# Patient Record
Sex: FEMALE | Race: WHITE | NOT HISPANIC OR LATINO | Employment: FULL TIME | URBAN - METROPOLITAN AREA
[De-identification: names, ages, dates, MRNs, and addresses within clinical notes are randomized per-mention and may not be internally consistent; named-entity substitution may affect disease eponyms.]

---

## 2017-01-05 ENCOUNTER — ALLSCRIPTS OFFICE VISIT (OUTPATIENT)
Dept: OTHER | Facility: OTHER | Age: 37
End: 2017-01-05

## 2017-01-13 ENCOUNTER — ALLSCRIPTS OFFICE VISIT (OUTPATIENT)
Dept: OTHER | Facility: OTHER | Age: 37
End: 2017-01-13

## 2017-01-13 ENCOUNTER — TRANSCRIBE ORDERS (OUTPATIENT)
Dept: ADMINISTRATIVE | Facility: HOSPITAL | Age: 37
End: 2017-01-13

## 2017-01-13 DIAGNOSIS — R20.0 FACIAL NUMBNESS: Primary | ICD-10-CM

## 2017-01-19 ENCOUNTER — HOSPITAL ENCOUNTER (OUTPATIENT)
Dept: CT IMAGING | Facility: CLINIC | Age: 37
Discharge: HOME | End: 2017-01-19
Payer: COMMERCIAL

## 2017-01-19 DIAGNOSIS — R20.0 FACIAL NUMBNESS: ICD-10-CM

## 2017-01-19 PROCEDURE — 70486 CT MAXILLOFACIAL W/O DYE: CPT

## 2017-01-21 ENCOUNTER — GENERIC CONVERSION - ENCOUNTER (OUTPATIENT)
Dept: OTHER | Facility: OTHER | Age: 37
End: 2017-01-21

## 2018-01-10 NOTE — RESULT NOTES
Message   Recorded as Task   Date: 05/16/2016 03:59 PM, Created By: Rober Arndt   Task Name: Call Patient with results   Assigned To: Caty Sotelo   Regarding Patient: Abbey Esquivel, Status: In Progress   CommentMadeleine Prader - 16 May 2016 3:59 PM     Patient Phone: [phone]    herniated disc---refer pain MD Pierre Canela - 17 May 2016 8:59 AM     TASK EDITED  Msg left C# to [facility] for results   Yolanda Chaparro - 17 May 2016 9:03 AM     TASK EDITED  PT has appt scheduled with [facility] on 5/24/16, Dr Sheryl Barriga    Will send task to  for him to review results  :)        Signatures   Electronically signed by : Kaiser Friend, ; May 17 2016  9:03AM EST                       (Author)

## 2018-01-10 NOTE — MISCELLANEOUS
Message   Recorded as Task   Date: 05/16/2016 03:59 PM, Created By: Ashleigh Rachel   Task Name: Call Patient with results   Assigned To: [facility]   Regarding Patient: Eugenia Dickerson, Status: Active   Comment:    Ashleigh Rachel - 16 May 2016 3:59 PM     Patient Phone: [phone]    herniated disc---refer pain Yolanda Sood - 17 May 2016 8:59 AM     TASK EDITED  Msg left C# to Macarthur Lefort for results   Yolanda Chaparro - 17 May 2016 9:03 AM     TASK EDITED  PT has appt scheduled with [facility] on 5/24/16, Dr Jeanette Hermosillo  Will send task to  for him to review results  :)   Yolanda Chaparro - 17 May 2016 9:04 AM     TASK EDITED  SL- for you to review- has CON 5/24/16  Yolanda Chaparro - 17 May 2016 9:04 AM     TASK EDITED   Asa Jason - 28 Dec 2016 2:02 PM     TASK EDITED  Pt arrived - cons 5/24/16        Active Problems    1  Acute bilateral low back pain with right-sided sciatica (724 2,724 3,338 19) (M54 41)   2  Anxiety and depression (300 00,311) (F41 9,F32 9)   3  Herniated nucleus pulposus, L4-5 right (722 10) (M51 26)   4  Herniated nucleus pulposus, L5-S1 (722 10) (M51 27)   5  Hip bursitis (726 5) (M70 70)   6  Intervertebral disc disorder with radiculopathy of lumbosacral region (724 4) (M51 17)   7  Right lumbar radiculopathy (724 4) (M54 16)    Current Meds   1  Aleve PM TABS; Therapy: (Recorded:32Fhq9495) to Recorded   2  Citalopram Hydrobromide 40 MG Oral Tablet; take 1 tablet by mouth every day; Therapy: 47DKN6606 to (Evaluate:14Wqw9607)  Requested for: 21Jun2016; Last   Rx:21Jun2016 Ordered    Allergies    1   No Known Drug Allergies    Signatures   Electronically signed by : Jose Luis Malone, ; Dec 28 2016  2:02PM EST                       (Author)

## 2018-01-11 NOTE — RESULT NOTES
Verified Results  CT SINUS WO CONTRAST 83FSE3983 06:14PM Ridge Ohs     Test Name Result Flag Reference   CT SINUS WO CONTRAST (Report)     CT SINUSES     INDICATION: R20 0: Anesthesia of skin (this clinical history is taken directly from the electronic ordering system)  Numbness in the nasal region radiating to the upper teeth  Axillary sinus pressure  COMPARISON: None  TECHNIQUE: Axial CT imaging through the sinuses was performed  In addition, sagittal and coronal reformatted images were submitted for interpretation  This examination, like all CT scans performed in the [facility], was performed    utilizing techniques to minimize radiation dose exposure, including the use of iterative reconstruction and automated exposure control  IMAGE QUALITY: Diagnostic  FINDINGS:      FRONTAL SINUSES: Clear  ETHMOID AIR CELLS: Clear  MAXILLARY SINUSES: There is minimal mucosal thickening in the floor the maxillary antra bilaterally  No maxillary sinus polyps or retention cysts  No maxillary sinus fluid levels  SPHENOID SINUSES: Clear  Patent sphenoethmoidal recesses  NASAL SEPTUM AND CAVITY: Midline  Normal nasal cavity  ANTERIOR OSTEOMEATAL COMPLEX: Patent  OSSEOUS STRUCTURES: No bony erosion or destruction  Normal cribiform plate and Planum Spendoidale  Visulaized mastoid temporal bones are clear  The bony walls of the carotid canals are intact  There is a cavity at the site of extracted left maxillary    tooth  SOFT TISSUES: Normal        IMPRESSION:     Unremarkable examination  No CT abnormality to account for the patient's symptoms         Workstation performed: [id number]     Signed by:   Rocio Escobar MD   1/20/17

## 2018-01-11 NOTE — PROGRESS NOTES
Assessment    1  Acute bilateral low back pain with right-sided sciatica (724 2,724 3,338 19) (M54 41)    Plan  Acute bilateral low back pain with right-sided sciatica    · Follow-up PRN Evaluation and Treatment  Follow-up  Status: Complete  Done:  15DIF2345 08:18AM   · 1 Angel Fernandez DO  (Pain Management) Physician Referral  Consult  Status: Active   Requested for: 69EDM3195  Care Summary provided  : Yes    Discussion/Summary    Needs MRI  Chief Complaint  C/O (RIGHT) Hip pain continues ([facility])      History of Present Illness  HPI: R sciatica for 3mos--no resp to homexercises--or pred --severe--rad down R leg      Active Problems    1  Anxiety and depression (300 00,311) (F41 9,F32 9)   2  Hip bursitis (726 5) (M70 70)    Past Medical History  Active Problems And Past Medical History Reviewed: The active problems and past medical history were reviewed and updated today  Family History  Mother    1  No pertinent family history  Family History Reviewed: The family history was reviewed and updated today  Social History    · Never a smoker    Current Meds   1  Citalopram Hydrobromide 40 MG Oral Tablet; TAKE 1 TABLET DAILY; Therapy: 67ROD8515 to (Evaluate:04Apr2016) Recorded    Allergies    1  No Known Drug Allergies    Vitals   Recorded: 09AYU1523 08:05AM   Heart Rate 88   Respiration 16   Systolic 678   Diastolic 78   Height 5 ft 8 in   Weight 163 lb    BMI Calculated 24 78   BSA Calculated 1 87   Pain Scale 10     Physical Exam    Constitutional   General appearance: No acute distress, well appearing and well nourished  Pulmonary   Auscultation of lungs: Clear to auscultation  Cardiovascular   Auscultation of heart: Normal rate and rhythm, normal S1 and S2, without murmurs  Abdomen   Abdomen: Non-tender, no masses  Musculoskeletal   Inspection/palpation of joints, bones, and muscles: Abnormal   + SLR @ 30 degr          Future Appointments    Date/Time Provider Specialty Site 05/09/2016 08:15 AM Yeni Goode MD Family Medicine [address] [place] [address]     Signatures   Electronically signed by : Evelyn Weldon MD; May  9 2016  8:19AM EST                       (Author)

## 2018-01-11 NOTE — CONSULTS
Assessment  Assessed    1  Herniated nucleus pulposus, L4-5 right (722 10) (M51 26)   2  Herniated nucleus pulposus, L5-S1 (722 10) (M51 27)   3  Right lumbar radiculopathy (724 4) (M54 16)    Plan  Herniated nucleus pulposus, L4-5 right, Herniated nucleus pulposus, L5-S1, Right  lumbar radiculopathy    · *1 - [facility] Physical Therapy Physical Therapy  Consult  Status: Hold For - Scheduling   Requested for: 36GBF0705   Ordered; For: Herniated nucleus pulposus, L4-5 right, Herniated nucleus pulposus, L5-S1, Right lumbar radiculopathy; Ordered By: Joanna Laird Performed:  Due: 82QAO9663  Care Summary provided  : Yes    Discussion/Summary    I think she should do well with a course of physical therapy, Nelia approach, prescription was provided  LASTs pain persists despite time, relative rest, activity modification and therapy  I believe the she would benefit from right L5/S1 level transforaminal epidural steroid injection to diminish any inflammatory component of her pain  We will initially use a transforaminal approach to better concentrate the steroid along the affected nerve root  The injection may need to be repeated based on the degree of pain relief following the initial injection  In the office today, we reviewed the nature of GRECIA's pathology in depth using diagrams and models  We discussed the approach we would use for the epidural steroid injection and provided literature for home review  The patient understands the risks associated with the procedure including bleeding, infection, tissue injury, allergic reaction and paralysis and provided written and verbal consent in the office today  Chief Complaint  Chief Complaints    1   Pain    History of Present Illness  New consult - low back and right leg pain   No pervious or current injuries  Previous steroid injection from Dr Nanette Francis - did not help  No previous or current PT  jvma    I personally reviewed the patient's past medical history, surgical history, allergies, current medications social history, family history and review of systems  Full intake form reviewed with the patient  Referring physician is  Dr Nanette Francis   Primary Care physician is  Dr Benz Sensor presents with complaints of constant episodes of severe bilateral lower back, right buttock, right hip, right thigh, right knee, right lower leg, right ankle and right foot pain, described as sharp, radiating to the lower back, right buttock and right lower extremity  On a scale of 1 to 10, the patient rates the pain as 8  Episodes started about 11 months ago  Her symptoms are caused by no known event  Symptoms are improved by rest  Symptoms are made worse by walking, exercise and bending  Symptoms are worsening  Review of Systems    Musculoskeletal: difficulty walking, muscle weakness, pain in extremity right leg and decreased range of motion  ROS reviewed  Active Problems  Problems    1  Acute bilateral low back pain with right-sided sciatica (724 2,724 3,338 19) (M54 41)   2  Anxiety and depression (300 00,311) (F41 9,F32 9)   3  Hip bursitis (726 5) (M70 70)    Past Medical History  Problems    1  History of depression (V11 8) (Z86 59)    The active problems and past medical history were reviewed and updated today  Surgical History  Problems    1  History of Oral Surgery Tooth Extraction [place] Tooth    The surgical history was reviewed and updated today  Family History  Family History    1  Family history of lung cancer (V16 1) (Z80 1)   2  Family history of lymphoma (V16 7) (Z80 2)    The family history was reviewed and updated today  Social History  Problems    · Denied: History of Alcohol drinker   · Full-time employment   · Lives with family   · Denied: History of Marijuana   · Never a smoker   · Single   · Denied: History of Tobacco use  The social history was reviewed and updated today  Current Meds   1   Aleve PM TABS; Therapy: (Recorded:90Wes4171) to Recorded   2  Citalopram Hydrobromide 40 MG Oral Tablet; TAKE 1 TABLET DAILY; Therapy: 14TZS1473 to (Evaluate:04Apr2016) Recorded    The medication list was reviewed and updated today  Allergies  Medication    1  No Known Drug Allergies    Vitals  Vital Signs [Data Includes: Current Encounter]    Recorded: 36BKK4338 08:59AM   Temperature 98 1 F   Heart Rate 81   Respiration 18   Systolic 883   Diastolic 64   Height 5 ft 8 in   Weight 163 lb    BMI Calculated 24 78   BSA Calculated 1 88   Pain Scale 9     Physical Exam    Constitutional   General appearance: Well developed, well nourished, alert, in no distress, non-toxic and no overt pain behavior  Eyes   Sclera: anicteric   HEENT   Hearing grossly intact  Neck   Neck: Supple, symmetric, trachea midline, no masses  Pulmonary   Respiratory effort: Even and unlabored  Cardiovascular   Examination of extremities: No edema or pitting edema present  Skin   Skin and subcutaneous tissue: Normal without rashes or lesions, well hydrated  Psychiatric   Mood and affect: Abnormal   Mood and Affect: in pain  Neurologic   Cranial nerves: Cranial nerves II-XII grossly intact  Musculoskeletal   Gait and station: Normal     Lumbar/Sacral Spine examination demonstrates  Inspection: Normal station and gait  Normal lumbar lordotic curve with no significant scoliosis or spinal step-off  Skin intact without erythema  No gross mass or muscle atrophy  Palpation: There is no tenderness to palpation overlying the sacroiliac joint as well as the ischial bursa bilateral  No significant tenderness over the greater trochanteric bursa bilaterally  Motor/Strength: 5/5 strength in the bilateral lower limbs  The patient is able to heel and/toe walk  Tandem gait is intact     Reflexes: Deep tendon reflex are 2+ and symmetrical bilateral patella and Achilles  Sensation: Sensation intact to light touch and pinprick in the bilateral lower limbs  Proprioception is intact at bilateral hallux  Maneuvers: Negative bilateral straight leg raisinig  Negative Raúl's maneuver  Inspection: Normal station and gait  Normal lumbar lordotic curve with no significant scoliosis or spinal step-off  Skin intact without erythema  No gross mass or muscle atrophy  Palpation: There is no tenderness to palpation overlying the sacroiliac joint as well as the ischial bursa bilateral  No significant tenderness over the greater trochanteric bursa bilaterally  Motor/Strength: 5/5 strength in the bilateral lower limbs  The patient is able to heel and/toe walk  Tandem gait is intact  Reflexes: Deep tendon reflex are 2+ and symmetrical bilateral patella and Achilles  Sensation: Sensation intact to light touch and pinprick in the bilateral lower limbs  Proprioception is intact at bilateral hallux  Maneuvers: Positive straight leg raising on the right  Negative Raúl's maneuver  Results/Data    Results    I personally reviewed the films/images in the office today  Results   * MRI LUMBAR SPINE WO CONTRAST [id number] 08:00AM [facility] Order Number: [id number]     Test Name Result Flag Reference   MRI LUMBAR SPINE [address] (Report)     This is a summary report  The complete report is available in the patient's medical record  If you cannot access the medical record, please contact the sending organization for a detailed fax or copy  MRI LUMBAR SPINE WITHOUT CONTRAST     INDICATION: Low back pain into the right hip with some occasional foot and calf numbness  COMPARISON: Plain films September 9, 2015     TECHNIQUE: Sagittal T1, sagittal T2, sagittal inversion recovery, axial T1 and axial T2, coronal T2       IMAGE QUALITY: Diagnostic     FINDINGS:     ALIGNMENT: Slight straightening of the normal lumbar lordosis  MARROW SIGNAL: Endplate degenerative marrow signal L4-5       DISTAL CORD AND CONUS: Normal size and signal within the distal cord and conus  The conus ends at the L1 level  PARASPINAL SOFT TISSUES: Paraspinal soft tissues are unremarkable  SACRUM: Normal signal within the sacrum  No evidence of insufficiency or stress fracture  LOWER THORACIC DISC SPACES: Normal disc height and signal  No disc herniation, canal stenosis or foraminal narrowing  LUMBAR DISC SPACES:        L1-L2: Normal      L2-L3: Normal      L3-L4: Normal      L4-L5: Diffuse annular bulging identified somewhat eccentric to the right with marginal osteophytes  Mild central and moderate right lateral recess stenosis is identified  Disc material contacts the right L5 nerve root in the lateral recess  L5-S1: Broad-based central protrusion type disc herniation identified which contacts the bilateral S1 nerve root in the lateral recess  IMPRESSION:     Annular bulging eccentric to the right at L4-5 contacts the right L5 nerve root in the lateral recess  Correlate for right L5 radiculopathy  This broad-based central protrusion type disc herniation L5-S1 contacts the bilateral S1 nerve roots in the lateral recess  Workstation performed: [id number]     Signed by:   Marlin Alcala DO   5/16/16     * Hip Xray 2 View 27VCZ7532 02:00PM Dinora Blank     Test Name Result Flag Reference   XR Hip 2 View (Report)     [address];;[place];[address]   09/09/2015 1405   09/09/2015 1428   3 VIEWS     RIGHT HIP     INDICATION- Right hip pain/numbness x3 months  COMPARISON- None     VIEWS- AP pelvis and 2 coned down views of the hip& 3 images^ 3 images     FINDINGS-     There is no acute fracture or dislocation  No degenerative changes  No lytic or blastic lesions are seen  Soft tissues are unremarkable  IMPRESSION-     Normal examination         Transcribed on- [id number]     NIA Ji MD   Reading Radiologist- NIA Meeks MD   Electronically Signed- NIA Jenkins MD   Released Date Time- 09/09/15 1453   ------------------------------------------------------------------------------   [id number]^ANEUDY ADAM   [id number]^ANEUDY ADAM     * XR Lumbosacral Spine Complete 4 Views (non-injury) 13ODY3024 02:00PM Elmo Rehman     Test Name Result Flag Reference   XR LSpine>4 View (Report)     [address] [facility] [address] [place] [address];;[place];[address]   09/09/2015 1405   09/09/2015 1428   4 VIEWS     LUMBAR SPINE     INDICATION- Right leg pain/numbness  COMPARISON- None     VIEWS- AP, lateral and bilateral oblique projections& 4 images^ 4 images     FINDINGS-     Alignment is unremarkable  There is no radiographic evidence of acute fracture or destructive   osseous lesion  Moderate disc degenerative changes at L4-5  Visualized soft tissues appear unremarkable  IMPRESSION-     Moderate disc degenerative changes at L4-5                 Transcribed on- [id number]     NIA Horton MD   Reading Radiologist- NIA Jenkins MD   Electronically Signed- NIA Jenkins MD   Released Date Time- 09/09/15 1454   ------------------------------------------------------------------------------   [id number]^ANEUDY ADAM   [id number]^ANEUDY ADAM     Signatures   Electronically signed by : Moe Christiansen DO; May 24 2016  9:10AM EST                       (Author)

## 2018-01-12 VITALS
DIASTOLIC BLOOD PRESSURE: 78 MMHG | HEART RATE: 102 BPM | OXYGEN SATURATION: 98 % | BODY MASS INDEX: 21.22 KG/M2 | WEIGHT: 140 LBS | SYSTOLIC BLOOD PRESSURE: 116 MMHG | HEIGHT: 68 IN

## 2018-01-12 NOTE — RESULT NOTES
Verified Results  * MRI LUMBAR SPINE WO CONTRAST [id number] 08:00AM Jesusita Cordoba Order Number: [id number]     Test Name Result Flag Reference   MRI LUMBAR SPINE [address] (Report)     This is a summary report  The complete report is available in the patient's medical record  If you cannot access the medical record, please contact the sending organization for a detailed fax or copy  MRI LUMBAR SPINE WITHOUT CONTRAST     INDICATION: Low back pain into the right hip with some occasional foot and calf numbness  COMPARISON: Plain films September 9, 2015     TECHNIQUE: Sagittal T1, sagittal T2, sagittal inversion recovery, axial T1 and axial T2, coronal T2       IMAGE QUALITY: Diagnostic     FINDINGS:     ALIGNMENT: Slight straightening of the normal lumbar lordosis  MARROW SIGNAL: Endplate degenerative marrow signal L4-5  DISTAL CORD AND CONUS: Normal size and signal within the distal cord and conus  The conus ends at the L1 level  PARASPINAL SOFT TISSUES: Paraspinal soft tissues are unremarkable  SACRUM: Normal signal within the sacrum  No evidence of insufficiency or stress fracture  LOWER THORACIC DISC SPACES: Normal disc height and signal  No disc herniation, canal stenosis or foraminal narrowing  LUMBAR DISC SPACES:        L1-L2: Normal      L2-L3: Normal      L3-L4: Normal      L4-L5: Diffuse annular bulging identified somewhat eccentric to the right with marginal osteophytes  Mild central and moderate right lateral recess stenosis is identified  Disc material contacts the right L5 nerve root in the lateral recess  L5-S1: Broad-based central protrusion type disc herniation identified which contacts the bilateral S1 nerve root in the lateral recess  IMPRESSION:     Annular bulging eccentric to the right at L4-5 contacts the right L5 nerve root in the lateral recess  Correlate for right L5 radiculopathy       This broad-based central protrusion type disc herniation L5-S1 contacts the bilateral S1 nerve roots in the lateral recess         Workstation performed: [id number]     Signed by:   Anil Whitney DO   5/16/16

## 2018-01-13 NOTE — MISCELLANEOUS
Message   Recorded as Task   Date: 05/12/2016 04:16 PM, Created By: Irvin Tamayo   Task Name: Miscellaneous   Assigned To: SPINE AND PAIN,Team   Regarding Patient: Adrien Frederick, Status: Active   CommentJosphine Goodness - 12 May 2016 4:16 PM     TASK CREATED  Please obtain a referral for this specialty appointment  Pt is scheduled 5/24/2016 with Dr Rivers, pain management,  pt's dx acute fidel low back pain with right sided sciatica, pt's insurance is [facility], pt's pcp is Dr Reyes  Thank you  Sherri Carter - 12 May 2016 4:36 PM     TASK REASSIGNED: Previously Assigned To specialty referral,TEAM  APPT [id number] WITH DR Juan Melton, WWB#[id number], VALID [phone], PT NOTIFIED (REFERRAL CREATED BY MO2)    MB        Active Problems    1  Acute bilateral low back pain with right-sided sciatica (724 2,724 3,338 19) (M54 41)   2  Anxiety and depression (300 00,311) (F41 9,F32 9)   3  Hip bursitis (726 5) (M70 70)    Current Meds   1  Citalopram Hydrobromide 40 MG Oral Tablet; TAKE 1 TABLET DAILY; Therapy: 44TLX7660 to (Evaluate:04Apr2016) Recorded    Allergies    1   No Known Drug Allergies    Signatures   Electronically signed by : Sage Carrizales, ; May 18 2016 10:14AM EST                       (Author)

## 2018-01-13 NOTE — MISCELLANEOUS
Message   Recorded as Task   Date: 2016 10:01 AM, Created By: Raúl Franklin   Task Name: Intake   Assigned To: SPINE AND PAIN,Team   Regarding Patient: Arely Darling, Status: Active   CommentDiogo Soto - 12 May 2016 10:01 AM     TASK CREATED  Date: 16    Patient Name: Nesha Kothari  : [birth year]  Address: [address], [place], [facility]  ]  Home Phone: [phone]  Cell Phone: [  ]    Insurance: [id number]  ]  Referral Required? Pt will check if referral is needed  PCP: Oniel Paul  Phone: [phone]      No  Date of Injury: [  ]  Employer: [  ]  Co: [  ]  Claim #: [  ]  Address: [  ]  Contact Name: [  ]  Phone #: [  ]    Reviewed: [  ]    Ref Phys: Chelsea Lowery  Phone #: [phone]    Reason for Appointment:   Studies:  No /TBD MRI back at [facility] on 16  Where: [  ]  When: [  ]    Have you seen a pain specialist in the past?  No  (needed records) Who, Where and When? [   ]    Notes: [  ]  Appointment: Date [  ] Time: [  ]    Dr Bam Randolph        Mailed Packet:  No  Printed Forms: date 16  MG * Pt infomed to arrive early for appt  Reviewed by: [  ]   Shaina Beard - 12 May 2016 11:34 AM     TASK REPLIED TO: Previously Assigned To SPINE AND PAIN,Team  All [place] ins require ins referrals  Raúl Franklin - 12 May 2016 4:17 PM     TASK EDITED  Pt referral has been requested through the specialty referral team  Pt is scheduled 2016 @9:00a  Active Problems    1  Acute bilateral low back pain with right-sided sciatica (724 2,724 3,338 19) (M54 41)   2  Anxiety and depression (300 00,311) (F41 9,F32 9)   3  Hip bursitis (726 5) (M70 70)    Current Meds   1  Citalopram Hydrobromide 40 MG Oral Tablet; TAKE 1 TABLET DAILY; Therapy: 49IUO5574 to (Evaluate:2016) Recorded    Allergies    1  No Known Drug Allergies    Signatures   Electronically signed by :  Kae Flower, ; May 13 2016 12:17PM EST (Author)

## 2018-01-14 VITALS
BODY MASS INDEX: 24.71 KG/M2 | DIASTOLIC BLOOD PRESSURE: 76 MMHG | SYSTOLIC BLOOD PRESSURE: 120 MMHG | HEIGHT: 68 IN | WEIGHT: 163 LBS

## 2018-01-18 NOTE — MISCELLANEOUS
Message   Recorded as Task   Date: 05/09/2016 08:19 AM, Created By: System   Task Name: Schedule Appointment   Assigned To: [facility],Team   Regarding Patient: Adrien Frederick, Status: In Progress   Comment:    System - 09 May 2016 8:19 AM     Preferred Communication: Home Phone  Ordering Site: [facility]    Referred To: Jaren Briones  (Pain Management)  To Be Done: 09 May 2016   Juany Omer - 09 May 2016 3:40 PM     TASK REASSIGNED: Previously Assigned To Po Reyes  Please call patient to schedule an appointment within 48 hours on their  The best time to reach the patient is in the  Patient prefers appointment to be  or     Last Mammogram date:[  ]    After this appointment is scheduled please reply back to [facility] team    Brionna De La Fuente - 10 May 2016 8:00 AM     TASK IN PROGRESS   JuanitoApril - 10 May 2016 1:44 PM     TASK EDITED  5/10 LVM @ [phone]   [facility] - 11 May 2016 12:50 PM     TASK EDITED  5/11 LVM @ [phone]   Concepción Hurt - 12 May 2016 10:06 AM     TASK EDITED  Please obtain a referral for this specialty appointment  Pt is sched 5/24/2016 with Dr Rivers, dx acute fidel LBP w right sided sciatica, pt's insurance is [facility], pt's pcp is Dr Reyes  Thank you  María Elena rAnett - 12 May 2016 10:11 AM     TASK IN PROGRESS   María Elena Arnett - 12 May 2016 10:53 AM     TASK REASSIGNED: Previously Assigned To specialty referral,TEAM  5/12/16 APPT WITH DR Yamileth Hollingsworth 5/24/16 INS REFERRAL IS COMPLETED & PATIENT HAS BEEN NOTIFIED/MO2        Active Problems    1  Acute bilateral low back pain with right-sided sciatica (724 2,724 3,338 19) (M54 41)   2  Anxiety and depression (300 00,311) (F41 9,F32 9)   3  Hip bursitis (726 5) (M70 70)    Current Meds   1  Citalopram Hydrobromide 40 MG Oral Tablet; TAKE 1 TABLET DAILY; Therapy: [id number] to (Evaluate:[id number]) Recorded    Allergies    1   No Known Drug Allergies    Signatures Electronically signed by :  Kaykay Cuevas, ; May 12 2016  2:10PM EST                       (Author)

## 2018-05-08 ENCOUNTER — OFFICE VISIT (OUTPATIENT)
Dept: FAMILY MEDICINE CLINIC | Facility: CLINIC | Age: 38
End: 2018-05-08
Payer: COMMERCIAL

## 2018-05-08 VITALS
HEIGHT: 68 IN | BODY MASS INDEX: 21.82 KG/M2 | SYSTOLIC BLOOD PRESSURE: 90 MMHG | OXYGEN SATURATION: 98 % | DIASTOLIC BLOOD PRESSURE: 60 MMHG | RESPIRATION RATE: 16 BRPM | HEART RATE: 116 BPM | WEIGHT: 144 LBS

## 2018-05-08 DIAGNOSIS — F41.9 ANXIETY: Primary | ICD-10-CM

## 2018-05-08 DIAGNOSIS — Z00.00 WELLNESS EXAMINATION: ICD-10-CM

## 2018-05-08 PROCEDURE — 1036F TOBACCO NON-USER: CPT | Performed by: FAMILY MEDICINE

## 2018-05-08 PROCEDURE — 3008F BODY MASS INDEX DOCD: CPT | Performed by: FAMILY MEDICINE

## 2018-05-08 PROCEDURE — 99213 OFFICE O/P EST LOW 20 MIN: CPT | Performed by: FAMILY MEDICINE

## 2018-05-08 PROCEDURE — 99395 PREV VISIT EST AGE 18-39: CPT | Performed by: FAMILY MEDICINE

## 2018-05-08 RX ORDER — CITALOPRAM 40 MG/1
40 TABLET ORAL DAILY
Qty: 90 TABLET | Refills: 3 | Status: SHIPPED | OUTPATIENT
Start: 2018-05-08 | End: 2019-05-19 | Stop reason: SDUPTHER

## 2018-05-08 NOTE — PROGRESS NOTES
HPI:  Padmini Siddiqi is a 40 y o  female here for her yearly health maintenance exam    There is no problem list on file for this patient  No past medical history on file  1  Advanced Directive: n     2  Durable Power of  for Healthcare:n     3  Social History:           Drug and alcohol History: n                  4  General Health: good              Immunizations up to date: y                 Lifestyle:                           Healthy Diet:y                          Alcohol Use:n                          Tobacco Use:n                          Regular exercise:y                          Weight concerns:n                               5  Over the past 2 weeks, how often have you been bothered by the following:              Little interest or pleasure in doing things:n              Felling down, depressed or hopeless:n       Current Outpatient Prescriptions   Medication Sig Dispense Refill    citalopram (CeleXA) 40 mg tablet Take 1 tablet (40 mg total) by mouth daily 90 tablet 3     No current facility-administered medications for this visit  No Known Allergies  Immunization History   Administered Date(s) Administered    Influenza Quadrivalent Preservative Free 3 years and older IM 10/10/2016       Patient Care Team:  Sylvie Edgar MD as PCP - General  Samantha Quinn, HEBER Grewal      Physical Exam :  Physical Exam     Reviewed Updated [facility] Prior Wellness Visits:   Last Health Maintenance visit information was reviewed, patient interviewed , no change since last HM visit yes  Last HM visit information was reviewed, patient interviewed and updates made to the record today yes    Assessment and Plan:  1  Anxiety  citalopram (CeleXA) 40 mg tablet   2  Wellness examination         There are no preventive care reminders to display for this patient

## 2018-05-08 NOTE — PROGRESS NOTES
[id number]         NAME: Garry Esposito is a 40 y o  female  : 1980    MRN: [id number]  DATE: May 8, 2018  TIME: 2:50 PM    Assessment and Plan   Anxiety [F41 9]  1  Anxiety  citalopram (CeleXA) 40 mg tablet   2  Wellness examination           Patient Instructions     Patient Instructions   celexa 36          Chief Complaint     Chief Complaint   Patient presents with    Follow-up     wants to go back on celexa    Physical Exam     HM         History of Present Illness       Wants to restart Celexa        Review of Systems   Review of Systems   Constitutional: Negative  Negative for chills, fatigue, fever and unexpected weight change  HENT: Negative  Negative for congestion, ear pain, rhinorrhea, sinus pain and sore throat  Eyes: Negative  Negative for pain, discharge and redness  Respiratory: Negative  Negative for cough, chest tightness, shortness of breath and wheezing  Cardiovascular: Negative  Negative for chest pain, palpitations and leg swelling  Gastrointestinal: Negative for abdominal pain, nausea and vomiting  Endocrine: Negative  Genitourinary: Negative  Negative for dysuria and hematuria  Musculoskeletal: Negative  Negative for arthralgias and myalgias  Allergic/Immunologic: Negative  Neurological: Negative  Negative for dizziness, syncope, speech difficulty, numbness and headaches  Hematological: Negative  Psychiatric/Behavioral: Negative  Negative for agitation, confusion, hallucinations and suicidal ideas           Current Medications       Current Outpatient Prescriptions:     citalopram (CeleXA) 40 mg tablet, Take 1 tablet (40 mg total) by mouth daily, Disp: 90 tablet, Rfl: 3    Current Allergies     Allergies as of 2018    (No Known Allergies)            The following portions of the patient's history were reviewed and updated as appropriate: allergies, current medications, past family history, past medical history, past social history, past surgical history and problem list      No past medical history on file  No past surgical history on file  Family History   Problem Relation Age of Onset    Diabetes Brother     Lung cancer Maternal Grandmother     Lymphoma Maternal Grandfather     Lung cancer Paternal Grandmother     Stomach cancer Paternal Grandfather     Substance Abuse Neg Hx     Mental illness Neg Hx          Medications have been verified  Objective   BP 90/60 (BP Location: Right arm, Patient Position: Sitting, Cuff Size: Standard)   Pulse (!) 116   Resp 16   Ht 5' 8" (1 727 m)   Wt 65 3 kg (144 lb)   SpO2 98%   BMI 21 90 kg/m²        Physical Exam     Physical Exam   Constitutional: She appears well-developed and well-nourished  HENT:   Right Ear: Tympanic membrane, external ear and ear canal normal  Tympanic membrane is not injected  Left Ear: Tympanic membrane, external ear and ear canal normal  Tympanic membrane is not injected  Nose: Nose normal    Mouth/Throat: Oropharynx is clear and moist and mucous membranes are normal    Eyes: Conjunctivae are normal  Pupils are equal, round, and reactive to light  Neck: Normal range of motion  Neck supple  No thyromegaly present  Cardiovascular: Normal rate, regular rhythm, normal heart sounds and intact distal pulses  Pulmonary/Chest: Effort normal and breath sounds normal  She has no wheezes  Nursing note and vitals reviewed

## 2019-05-19 DIAGNOSIS — F41.9 ANXIETY: ICD-10-CM

## 2019-05-20 RX ORDER — CITALOPRAM 40 MG/1
TABLET ORAL
Qty: 90 TABLET | Refills: 3 | Status: SHIPPED | OUTPATIENT
Start: 2019-05-20 | End: 2020-08-03 | Stop reason: ALTCHOICE

## 2020-08-03 ENCOUNTER — OFFICE VISIT (OUTPATIENT)
Dept: FAMILY MEDICINE CLINIC | Facility: CLINIC | Age: 40
End: 2020-08-03
Payer: COMMERCIAL

## 2020-08-03 VITALS
TEMPERATURE: 97.9 F | OXYGEN SATURATION: 98 % | BODY MASS INDEX: 20.64 KG/M2 | DIASTOLIC BLOOD PRESSURE: 68 MMHG | WEIGHT: 136.2 LBS | HEART RATE: 88 BPM | HEIGHT: 68 IN | SYSTOLIC BLOOD PRESSURE: 114 MMHG

## 2020-08-03 DIAGNOSIS — R42 VERTIGO: Primary | ICD-10-CM

## 2020-08-03 DIAGNOSIS — F41.9 ANXIETY: ICD-10-CM

## 2020-08-03 DIAGNOSIS — Z00.00 WELLNESS EXAMINATION: ICD-10-CM

## 2020-08-03 PROCEDURE — 3008F BODY MASS INDEX DOCD: CPT | Performed by: FAMILY MEDICINE

## 2020-08-03 PROCEDURE — 1036F TOBACCO NON-USER: CPT | Performed by: FAMILY MEDICINE

## 2020-08-03 PROCEDURE — 99395 PREV VISIT EST AGE 18-39: CPT | Performed by: FAMILY MEDICINE

## 2020-08-03 PROCEDURE — 3725F SCREEN DEPRESSION PERFORMED: CPT | Performed by: FAMILY MEDICINE

## 2020-08-03 PROCEDURE — 99214 OFFICE O/P EST MOD 30 MIN: CPT | Performed by: FAMILY MEDICINE

## 2020-08-03 RX ORDER — LORAZEPAM 1 MG/1
1 TABLET ORAL EVERY 8 HOURS PRN
Qty: 30 TABLET | Refills: 3 | Status: SHIPPED | OUTPATIENT
Start: 2020-08-03

## 2020-08-03 RX ORDER — MECLIZINE HYDROCHLORIDE 25 MG/1
25 TABLET ORAL 3 TIMES DAILY PRN
Qty: 30 TABLET | Refills: 2 | Status: SHIPPED | OUTPATIENT
Start: 2020-08-03

## 2020-08-03 RX ORDER — CITALOPRAM 40 MG/1
40 TABLET ORAL DAILY
Qty: 90 TABLET | Refills: 3 | Status: SHIPPED | OUTPATIENT
Start: 2020-08-03 | End: 2021-07-28

## 2020-08-03 RX ORDER — PREDNISONE 20 MG/1
TABLET ORAL
Qty: 15 TABLET | Refills: 0 | Status: SHIPPED | OUTPATIENT
Start: 2020-08-03

## 2020-08-03 NOTE — PROGRESS NOTES
[facility]        NAME: Yossi Valdez is a 44 y o  female  : 1980    MRN: [id number]  DATE: August 3, 2020  TIME: 3:34 PM    Assessment and Plan   Vertigo [R42]  1  Vertigo     2  Anxiety           Patient Instructions     Patient Instructions   PT for vertigo if cont--restart celexa          Chief Complaint     Chief Complaint   Patient presents with    Dizziness     x1week    Panic Attack     x1week    Headache     x1week         History of Present Illness       1 wk vertigo--incr anxiety      Review of Systems   Review of Systems   Constitutional: Negative for fatigue, fever and unexpected weight change  HENT: Negative for congestion, sinus pain and sore throat  Eyes: Negative for visual disturbance  Respiratory: Negative for shortness of breath and wheezing  Cardiovascular: Negative for chest pain and palpitations  Gastrointestinal: Negative for abdominal pain, nausea and vomiting  Musculoskeletal: Negative  Negative for arthralgias and myalgias  Neurological: Negative for syncope, weakness and numbness  Psychiatric/Behavioral: Negative  Negative for confusion, dysphoric mood and suicidal ideas  Current Medications     No current outpatient medications on file  Current Allergies     Allergies as of 2020    (No Known Allergies)            The following portions of the patient's history were reviewed and updated as appropriate: allergies, current medications, past family history, past medical history, past social history, past surgical history and problem list      History reviewed  No pertinent past medical history  History reviewed  No pertinent surgical history      Family History   Problem Relation Age of Onset    Diabetes Brother     Lung cancer Maternal Grandmother     Lymphoma Maternal Grandfather     Lung cancer Paternal Grandmother     Stomach cancer Paternal Grandfather     Substance Abuse Neg Hx     Mental illness Neg Hx          Medications have been verified  Objective   /68   Pulse 88   Temp 97 9 °F (36 6 °C) (Temporal)   Ht 5' 8"   Wt 61 8 kg (136 lb 3 2 oz)   LMP 08/01/2020   SpO2 98%   BMI 20 71 kg/m²        Physical Exam     Physical Exam   Constitutional: She is oriented to person, place, and time  She appears well-developed  HENT:   Right Ear: Ear canal normal  Tympanic membrane is not injected  Left Ear: Ear canal normal  Tympanic membrane is not injected  Nose: Nose normal    Eyes: Pupils are equal, round, and reactive to light  Conjunctivae are normal  Right eye exhibits no discharge  Left eye exhibits no discharge  Neck: Normal range of motion  Neck supple  No thyromegaly present  Cardiovascular: Normal rate, regular rhythm and normal heart sounds  No murmur heard  Pulmonary/Chest: Effort normal and breath sounds normal  No respiratory distress  She has no wheezes  Abdominal: Soft  Bowel sounds are normal  She exhibits no distension  There is no abdominal tenderness  Musculoskeletal: Normal range of motion  Lymphadenopathy:     She has no cervical adenopathy  Neurological: She is alert and oriented to person, place, and time  She has normal reflexes  She is not disoriented  No sensory deficit  Gait normal    Skin: Skin is warm and dry     Psychiatric: Her speech is normal and behavior is normal  Judgment and thought content normal

## 2020-08-03 NOTE — PROGRESS NOTES
HPI:  Macey Noble is a 44 y o  female here for her yearly health maintenance exam    There is no problem list on file for this patient  History reviewed  No pertinent past medical history  1  Advanced Directive: n     2  Durable Power of  for Healthcare: n     3  Social History:           Drug and alcohol History: n                  4  Immunizations up to date: y                 Lifestyle:                           Healthy Diet:y                          Alcohol Use:n                          Tobacco Use:n                          Regular exercise:y                          Weight concerns:n                               5  Over the past 2 weeks, how often have you been bothered by the following:              Little interest or pleasure in doing things:n              Felling down, depressed or hopeless:n       Current Outpatient Medications   Medication Sig Dispense Refill    citalopram (CeleXA) 40 mg tablet Take 1 tablet (40 mg total) by mouth daily 90 tablet 3    LORazepam (ATIVAN) 1 mg tablet Take 1 tablet (1 mg total) by mouth every 8 (eight) hours as needed for anxiety 30 tablet 3    meclizine (ANTIVERT) 25 mg tablet Take 1 tablet (25 mg total) by mouth 3 (three) times a day as needed for dizziness 30 tablet 2    predniSONE 20 mg tablet TAKE 1 TABLET BID X 5 DAYS THEN TAKE 1 TABLET QD FOR 5 DAYS 15 tablet 0     No current facility-administered medications for this visit  No Known Allergies  Immunization History   Administered Date(s) Administered    Influenza Quadrivalent Preservative Free 3 years and older IM 10/10/2016       Patient Care Team:  Randell Headings as PCP - General (Family Medicine)  [employer], DO  [place] HEBER Prado    Review of Systems   Constitutional: Negative for appetite change, chills, diaphoresis and fever  HENT: Negative for ear pain, rhinorrhea, sinus pressure and sore throat  Eyes: Negative for discharge, redness and itching     Respiratory: Negative for cough, shortness of breath and wheezing  Cardiovascular: Negative for chest pain and palpitations  Gastrointestinal: Negative for abdominal pain, diarrhea, nausea and vomiting  Physical Exam :  Physical Exam   Constitutional: She appears well-developed  No distress  HENT:   Right Ear: Tympanic membrane, external ear and ear canal normal  Tympanic membrane is not injected  Left Ear: Tympanic membrane, external ear and ear canal normal  Tympanic membrane is not injected  Nose: Nose normal    Eyes: Pupils are equal, round, and reactive to light  Conjunctivae are normal  Right eye exhibits no discharge  Left eye exhibits no discharge  Neck: Normal range of motion  Neck supple  No thyromegaly present  Cardiovascular: Normal rate, regular rhythm and normal heart sounds  No murmur heard  Pulmonary/Chest: Effort normal and breath sounds normal  No respiratory distress  She has no wheezes  Lymphadenopathy:     She has no cervical adenopathy  Skin: She is not diaphoretic  Nursing note and vitals reviewed  Assessment and Plan:  1  Vertigo  meclizine (ANTIVERT) 25 mg tablet    predniSONE 20 mg tablet   2  Anxiety  citalopram (CeleXA) 40 mg tablet    LORazepam (ATIVAN) 1 mg tablet   3   Wellness examination         Health Maintenance Due   Topic Date Due    DTaP,Tdap,and Td Vaccines (1 - Tdap) 10/27/2001    Cervical Cancer Screening  10/27/2001    Annual Physical  05/08/2019    Influenza Vaccine  07/01/2020

## 2022-07-28 DIAGNOSIS — F41.9 ANXIETY: ICD-10-CM

## 2022-07-28 RX ORDER — CITALOPRAM 40 MG/1
TABLET ORAL
Qty: 30 TABLET | Refills: 11 | Status: SHIPPED | OUTPATIENT
Start: 2022-07-28